# Patient Record
Sex: FEMALE | Race: WHITE | NOT HISPANIC OR LATINO | URBAN - METROPOLITAN AREA
[De-identification: names, ages, dates, MRNs, and addresses within clinical notes are randomized per-mention and may not be internally consistent; named-entity substitution may affect disease eponyms.]

---

## 2024-03-28 ENCOUNTER — TELEPHONE (OUTPATIENT)
Dept: ALLERGY | Facility: CLINIC | Age: 44
End: 2024-03-28

## 2024-04-09 ENCOUNTER — OFFICE VISIT (OUTPATIENT)
Dept: ALLERGY | Facility: CLINIC | Age: 44
End: 2024-04-09
Payer: COMMERCIAL

## 2024-04-09 VITALS — WEIGHT: 244.6 LBS | OXYGEN SATURATION: 96 % | RESPIRATION RATE: 20 BRPM | HEART RATE: 85 BPM

## 2024-04-09 DIAGNOSIS — Z71.1 WORRIED WELL: ICD-10-CM

## 2024-04-09 DIAGNOSIS — J45.30: Primary | ICD-10-CM

## 2024-04-09 PROCEDURE — 99205 OFFICE O/P NEW HI 60 MIN: CPT | Mod: 25 | Performed by: ALLERGY & IMMUNOLOGY

## 2024-04-09 PROCEDURE — 95004 PERQ TESTS W/ALRGNC XTRCS: CPT | Performed by: ALLERGY & IMMUNOLOGY

## 2024-04-09 RX ORDER — FLUTICASONE PROPIONATE AND SALMETEROL 100; 50 UG/1; UG/1
1 POWDER RESPIRATORY (INHALATION) EVERY 12 HOURS
COMMUNITY

## 2024-04-09 NOTE — LETTER
4/9/2024         RE: Florence Nevarez  [address] [place] [address]        Dear Colleague,    Thank you for referring your patient, Florence Nevarez, to the [facility]. Please see a copy of my visit note below.          Subjective  Florence is a 44 year old, presenting for the following health issues:  Allergy Consult (Concern for allergies to cats, coffee, oatmeal, oat milk. Breathing issues. )    HPI     Chief complaint: Breathing issues    History of present illness: This is a 44-year-old woman here today to discuss breathing issues.  She states last September she started develop shortness of breath wheezing and coughing.  She states she had to go to the emergency department.  She states at that time she thought maybe she was allergic to cats that she obtained a second cat in May of this last year.  She has remove the cats from her home currently.  She states that she would have a lot of chest tightness and mucus production.  She states the cough would wake her up from sleep.  She was recently on prednisone and also started on what sounds like and a with Wixela inhaler.  She is not sure of the dose.  Per the record it looks like is the 100-50 dose.  She does demonstrate correct technique but is not rinsing her mouth after using.  She is using 1 puff twice daily and with this she reports less coughing shortness of breath and wheezing.  She reports the prednisone helped but she needed 2 courses.  She states while she was on the prednisone symptoms improved.  She states even small distances such as walking upstairs used to make her breathless.  She is never had asthma before.  She did have a chest x-ray that was normal.  No pulmonary function tests.  She is worried about being allergic to coffee or oatmeal and she coughs after eating these foods.  She eats them regularly.  No hives swelling otherwise.  She has never had allergy testing.  She does not take any allergy medication.  She  does not use any nasal sprays or rinses.  She denies a history of reflux.  No history of sinus disease.  She states in September when this began she does not recall being sick but she states she had a sore throat.    Past medical history: Otherwise unremarkable    [facility] history[facility] she states there is a brown spot on her ceiling where there may be mold but she is not sure, she does have the 2 cats but recently rehomed them    Family history: No immediate family members with asthma    Review of systems: Per the record sheet patient had some anxiety focused on finding the exact etiology of her symptoms      Objective   Pulse 85   Resp 20   Wt 110.9 kg (244 lb 9.6 oz)   SpO2 96%   There is no height or weight on file to calculate BMI.  Physical Exam   Gen: Pleasant female not in acute distress  HEENT: Eyes no erythema of the bulbar or palpebral conjunctiva, no edema. Ears: TMs well visualized, no effusions. Nose: No congestion, mucosa normal. Mouth: Throat significant white drainage on the back of her throat, no thrush  Respiratory: Clear to auscultation bilaterally, no adventitious breath sounds  Skin: No rashes or lesions  Psych: Alert and oriented times 3      At today s visit the patient/parent and I engaged in an informed consent discussion about allergy testing.  We discussed skin testing, blood testing,  and the alternative of not undergoing any testing. The patient/ parent has a preference for skin testing. We then discussed the risks and benefits of skin testing.  The patient/ parent understands skin testing risks can include, but are not limited to, urticaria, angioedema, shortness of breath, and severe anaphylaxis.  The benefits include, but are not limited, to evaluation for allergens causing symptoms.  After answering the patients/parents questions they have agreed to proceed with skin testing.    31 percutaneous test were placed to the environmental skin test and oats.   Positive some control with a negative allergy skin test.  Please see scanned photograph.  Procedure time 30 minutes.    Impression report and plan:  1.  Mild persistent asthma    Patient has symptoms consistent with asthma.  Recommend pulmonary function test.  Explained to the patient in extreme detail that sometimes we do not understand why patients develop asthma.  Because she has no allergies, it is likely nonallergic.  She may have some symptoms consistent with reflux as she coughs after eating.  I explained to her in detail that this is not food allergy but she continued to insist on testing for oats and I did agree with the fact that she would come in and eat the oats together she did have a positive test.  However, she was negative.  I stated other food allergy testing is inappropriate and I received declines doing any further as she has no IgE-mediated symptoms immediately after eating foods.  Given her negative environmental testing, I stated nonallergic asthma it is the more likely diagnosis and smoke, and air pollution should not be considered an allergen but an irritant.  Reviewed technique with her inhaler and stated that she should be rinsing her mouth after using.  I did go over thrush as she did have a copious amount of drainage in the back of her throat.  She should follow in 6 months for an asthma recheck either with myself or her primary care physician.  Patient appeared to have some difficulty understanding that asthma was not curable and I cautioned her to stopping her inhaler as she stated that when she feels better she would like to stop the inhaler.  She did express some anxiety with not knowing why she developed asthma and unfortunately, I stated this is the case for many patients.    Time spent with patient, chart review and documentation, 65 minutes on date of service in addition to 30 minute procedure time.      Signed Electronically by: Rhiannon ELLINGTON MD        Again, thank you for  allowing me to participate in the care of your patient.        Sincerely,        Rhiannon ELLINGTON MD

## 2024-04-09 NOTE — PROGRESS NOTES
Nel Henriquez is a 44 year old, presenting for the following health issues:  Allergy Consult (Concern for allergies to cats, coffee, oatmeal, oat milk. Breathing issues. )    HPI     Chief complaint: Breathing issues    History of present illness: This is a 44-year-old woman here today to discuss breathing issues.  She states last September she started develop shortness of breath wheezing and coughing.  She states she had to go to the emergency department.  She states at that time she thought maybe she was allergic to cats that she obtained a second cat in May of this last year.  She has remove the cats from her home currently.  She states that she would have a lot of chest tightness and mucus production.  She states the cough would wake her up from sleep.  She was recently on prednisone and also started on what sounds like and a with Wixela inhaler.  She is not sure of the dose.  Per the record it looks like is the 100-50 dose.  She does demonstrate correct technique but is not rinsing her mouth after using.  She is using 1 puff twice daily and with this she reports less coughing shortness of breath and wheezing.  She reports the prednisone helped but she needed 2 courses.  She states while she was on the prednisone symptoms improved.  She states even small distances such as walking upstairs used to make her breathless.  She is never had asthma before.  She did have a chest x-ray that was normal.  No pulmonary function tests.  She is worried about being allergic to coffee or oatmeal and she coughs after eating these foods.  She eats them regularly.  No hives swelling otherwise.  She has never had allergy testing.  She does not take any allergy medication.  She does not use any nasal sprays or rinses.  She denies a history of reflux.  No history of sinus disease.  She states in September when this began she does not recall being sick but she states she had a sore throat.    Past medical history:  Otherwise unremarkable    Social history: Lives in an older [facility], she states there is a brown spot on her ceiling where there may be mold but she is not sure, she does have the 2 cats but recently rehomed them    Family history: No immediate family members with asthma    Review of systems: Per the record sheet patient had some anxiety focused on finding the exact etiology of her symptoms      Objective    Pulse 85   Resp 20   Wt 110.9 kg (244 lb 9.6 oz)   SpO2 96%   There is no height or weight on file to calculate BMI.  Physical Exam   Gen: Pleasant female not in acute distress  HEENT: Eyes no erythema of the bulbar or palpebral conjunctiva, no edema. Ears: TMs well visualized, no effusions. Nose: No congestion, mucosa normal. Mouth: Throat significant white drainage on the back of her throat, no thrush  Respiratory: Clear to auscultation bilaterally, no adventitious breath sounds  Skin: No rashes or lesions  Psych: Alert and oriented times 3      At today s visit the patient/parent and I engaged in an informed consent discussion about allergy testing.  We discussed skin testing, blood testing,  and the alternative of not undergoing any testing. The patient/ parent has a preference for skin testing. We then discussed the risks and benefits of skin testing.  The patient/ parent understands skin testing risks can include, but are not limited to, urticaria, angioedema, shortness of breath, and severe anaphylaxis.  The benefits include, but are not limited, to evaluation for allergens causing symptoms.  After answering the patients/parents questions they have agreed to proceed with skin testing.    31 percutaneous test were placed to the environmental skin test and oats.  Positive some control with a negative allergy skin test.  Please see scanned photograph.  Procedure time 30 minutes.    Impression report and plan:  1.  Mild persistent asthma    Patient has symptoms consistent with asthma.  Recommend pulmonary  function test.  Explained to the patient in extreme detail that sometimes we do not understand why patients develop asthma.  Because she has no allergies, it is likely nonallergic.  She may have some symptoms consistent with reflux as she coughs after eating.  I explained to her in detail that this is not food allergy but she continued to insist on testing for oats and I did agree with the fact that she would come in and eat the oats together she did have a positive test.  However, she was negative.  I stated other food allergy testing is inappropriate and I received declines doing any further as she has no IgE-mediated symptoms immediately after eating foods.  Given her negative environmental testing, I stated nonallergic asthma it is the more likely diagnosis and smoke, and air pollution should not be considered an allergen but an irritant.  Reviewed technique with her inhaler and stated that she should be rinsing her mouth after using.  I did go over thrush as she did have a copious amount of drainage in the back of her throat.  She should follow in 6 months for an asthma recheck either with myself or her primary care physician.  Patient appeared to have some difficulty understanding that asthma was not curable and I cautioned her to stopping her inhaler as she stated that when she feels better she would like to stop the inhaler.  She did express some anxiety with not knowing why she developed asthma and unfortunately, I stated this is the case for many patients.    Time spent with patient, chart review and documentation, 65 minutes on date of service in addition to 30 minute procedure time.      Signed Electronically by: Rhiannon ELLINGTON MD